# Patient Record
Sex: FEMALE
[De-identification: names, ages, dates, MRNs, and addresses within clinical notes are randomized per-mention and may not be internally consistent; named-entity substitution may affect disease eponyms.]

---

## 2019-01-31 ENCOUNTER — HOSPITAL ENCOUNTER (EMERGENCY)
Dept: HOSPITAL 57 - BURERS | Age: 59
Discharge: HOME | End: 2019-01-31
Payer: COMMERCIAL

## 2019-01-31 DIAGNOSIS — Z79.899: ICD-10-CM

## 2019-01-31 DIAGNOSIS — E03.9: ICD-10-CM

## 2019-01-31 DIAGNOSIS — J11.1: Primary | ICD-10-CM

## 2019-01-31 PROCEDURE — 99283 EMERGENCY DEPT VISIT LOW MDM: CPT

## 2019-01-31 PROCEDURE — 87804 INFLUENZA ASSAY W/OPTIC: CPT

## 2019-09-16 ENCOUNTER — HOSPITAL ENCOUNTER (EMERGENCY)
Dept: HOSPITAL 57 - BURERS | Age: 59
Discharge: HOME | End: 2019-09-16
Payer: COMMERCIAL

## 2019-09-16 DIAGNOSIS — K57.32: Primary | ICD-10-CM

## 2019-09-16 DIAGNOSIS — E03.9: ICD-10-CM

## 2019-09-16 LAB
ALBUMIN SERPL BCG-MCNC: 4 G/DL (ref 3.5–5)
ALP SERPL-CCNC: 182 U/L (ref 40–150)
ALT SERPL W P-5'-P-CCNC: 49 U/L (ref 8–55)
ANION GAP SERPL CALC-SCNC: 14 MMOL/L (ref 10–20)
AST SERPL-CCNC: 61 U/L (ref 5–34)
BILIRUB SERPL-MCNC: 0.6 MG/DL (ref 0.2–1.2)
BUN SERPL-MCNC: 9 MG/DL (ref 9.8–20.1)
CALCIUM SERPL-MCNC: 9.4 MG/DL (ref 7.8–10.44)
CHLORIDE SERPL-SCNC: 103 MMOL/L (ref 98–107)
CO2 SERPL-SCNC: 25 MMOL/L (ref 22–29)
CREAT CL PREDICTED SERPL C-G-VRATE: 0 ML/MIN (ref 70–130)
GLOBULIN SER CALC-MCNC: 2.9 G/DL (ref 2.4–3.5)
GLUCOSE SERPL-MCNC: 122 MG/DL (ref 70–105)
POTASSIUM SERPL-SCNC: 3.9 MMOL/L (ref 3.5–5.1)
SODIUM SERPL-SCNC: 138 MMOL/L (ref 136–145)

## 2019-09-16 PROCEDURE — 81003 URINALYSIS AUTO W/O SCOPE: CPT

## 2019-09-16 PROCEDURE — 80053 COMPREHEN METABOLIC PANEL: CPT

## 2019-09-16 PROCEDURE — 96361 HYDRATE IV INFUSION ADD-ON: CPT

## 2019-09-16 PROCEDURE — 74177 CT ABD & PELVIS W/CONTRAST: CPT

## 2019-09-16 PROCEDURE — 96374 THER/PROPH/DIAG INJ IV PUSH: CPT

## 2019-09-16 NOTE — CT
CT ABDOMEN AND PELVIS WITH IV CONTRAST

 9/16/2019



CLINICAL INFORMATION:

Suprapubic pain.



COMPARISON:

 None.



Technique:

Multiple contiguous axial CT images are obtained through the abdomen and pelvis with IV contrast. Cor
onal reformatted images are provided.



FINDINGS:



Lower Chest: Mild dependent bibasilar atelectasis.

Vessels: The abdominal aorta is normal in caliber without evidence of an aortic dissection. 



Abdomen:

Portal vein:Patent

Gallbladder: Within normal limits for CT imaging.

Liver: within normal limits.

Spleen: within normal limits.

Pancreas: within normal limits.

Adrenals: within normal limits.

Kidneys: within normal limits.



Bowel: Small hiatal hernia is visualized.



There is colonic diverticuli seen throughout the colon. There is bowel wall thickening involving the 
sigmoid colon with adjacent pericolonic inflammatory changes present, and findings are suggestive

of diverticulitis. No free intraperitoneal gas is seen, and there is no fluid collection seen to sugg
est an adjacent abscess.



Appendix: The appendix is visualized and normal in caliber.





Peritoneum: Small amount of free fluid is seen in the pelvis.

Mesentery and Retroperitoneum: No enlarged mesenteric or retroperitoneal lymph nodes.

Abdominal Wall: A tiny fat-containing umbilical hernia is present.



Pelvis:

Reproductive Organs: No pelvic masses.

Pelvis within normal limits.

Bladder: Decompressed and not well evaluated.



Bones: No suspicious lytic or sclerotic osseous lesions are identified. Degenerative changes are seen
 at the pubic symphysis with suggestion of prior injury involving the right pubic bone.  



IMPRESSION:



1. Diverticulitis involving the sigmoid colon. Follow-up is recommended after treatment to ensure res
olution of the bowel wall thickening of the sigmoid colon. No free intraperitoneal gas or fluid

collection is seen in the pelvis.

2. Small amount of free fluid.

3. Small hiatal hernia.

4. Small fat-containing umbilical hernia.



Reported By: Luis Capps 

Electronically Signed:  9/16/2019 8:02 AM

## 2019-10-30 ENCOUNTER — HOSPITAL ENCOUNTER (OUTPATIENT)
Dept: HOSPITAL 92 - BICMAMMO | Age: 59
Discharge: HOME | End: 2019-10-30
Attending: OBSTETRICS & GYNECOLOGY
Payer: COMMERCIAL

## 2019-10-30 DIAGNOSIS — M85.88: ICD-10-CM

## 2019-10-30 DIAGNOSIS — Z13.820: Primary | ICD-10-CM

## 2019-10-30 PROCEDURE — 77080 DXA BONE DENSITY AXIAL: CPT

## 2019-10-30 NOTE — BD
BONE DENSITOMETRY USING DEXA:

 

HISTORY:

Post menopausal screening for osteoporosis. Encounter for screening for osteoporosis.

 

FINDINGS

LUMBAR SPINE           BMD (g/cm2)    T-SCORE    Z-SCORE

L1                     0.839          -1.4       -0.1

L2                     0.832          -1.8       -0.3

L3                     0.831          -2.3       -0.8

L4                     0.764          -2.7       -1.7

TOTAL                  0.815          -2.1       -0.6

 

                       BMD (g/cm2)    T-SCORE    Z-SCORE

NECK                   0.747          -0.9       0.2

TOTAL                  1.027           0.7       1.4

 

There has been an interval reduction of 6.6% in the BMD of the lumbar spine and an improvement of 4.8
% in the BMD of the proximal femur since 08/16/2017.

 

The ten year fracture risk for a  major osteoporotic fracture is 3.8% and for a hip fracture is 0.2%.


 

IMPRESSION:

Osteopenia.

 

POS: [facility]